# Patient Record
Sex: MALE | Race: BLACK OR AFRICAN AMERICAN | Employment: UNEMPLOYED | URBAN - METROPOLITAN AREA
[De-identification: names, ages, dates, MRNs, and addresses within clinical notes are randomized per-mention and may not be internally consistent; named-entity substitution may affect disease eponyms.]

---

## 2018-02-07 PROCEDURE — 99284 EMERGENCY DEPT VISIT MOD MDM: CPT

## 2018-02-07 PROCEDURE — 96365 THER/PROPH/DIAG IV INF INIT: CPT

## 2018-02-07 PROCEDURE — 96375 TX/PRO/DX INJ NEW DRUG ADDON: CPT

## 2018-02-08 ENCOUNTER — HOSPITAL ENCOUNTER (INPATIENT)
Age: 23
LOS: 1 days | Discharge: HOME | DRG: 101 | End: 2018-02-09
Attending: EMERGENCY MEDICINE | Admitting: FAMILY MEDICINE
Payer: SELF-PAY

## 2018-02-08 ENCOUNTER — APPOINTMENT (OUTPATIENT)
Dept: GENERAL RADIOLOGY | Age: 23
DRG: 101 | End: 2018-02-08
Attending: EMERGENCY MEDICINE
Payer: SELF-PAY

## 2018-02-08 ENCOUNTER — APPOINTMENT (OUTPATIENT)
Dept: MRI IMAGING | Age: 23
DRG: 101 | End: 2018-02-08
Attending: PSYCHIATRY & NEUROLOGY
Payer: SELF-PAY

## 2018-02-08 ENCOUNTER — APPOINTMENT (OUTPATIENT)
Dept: CT IMAGING | Age: 23
DRG: 101 | End: 2018-02-08
Attending: EMERGENCY MEDICINE
Payer: SELF-PAY

## 2018-02-08 DIAGNOSIS — R56.9 SEIZURE (HCC): Primary | ICD-10-CM

## 2018-02-08 PROBLEM — R51.9 HEADACHE: Status: ACTIVE | Noted: 2018-02-08

## 2018-02-08 LAB
ALBUMIN SERPL-MCNC: 4.3 G/DL (ref 3.5–5)
ALBUMIN/GLOB SERPL: 1.1 {RATIO} (ref 1.1–2.2)
ALP SERPL-CCNC: 91 U/L (ref 45–117)
ALT SERPL-CCNC: 20 U/L (ref 12–78)
AMPHET UR QL SCN: NEGATIVE
ANION GAP SERPL CALC-SCNC: 10 MMOL/L (ref 5–15)
APPEARANCE UR: CLEAR
AST SERPL-CCNC: 18 U/L (ref 15–37)
ATRIAL RATE: 95 BPM
BACTERIA URNS QL MICRO: NEGATIVE /HPF
BARBITURATES UR QL SCN: NEGATIVE
BASOPHILS # BLD: 0 K/UL (ref 0–0.1)
BASOPHILS NFR BLD: 1 % (ref 0–1)
BENZODIAZ UR QL: NEGATIVE
BILIRUB SERPL-MCNC: 1.3 MG/DL (ref 0.2–1)
BILIRUB UR QL: NEGATIVE
BUN SERPL-MCNC: 9 MG/DL (ref 6–20)
BUN/CREAT SERPL: 8 (ref 12–20)
CALCIUM SERPL-MCNC: 9.5 MG/DL (ref 8.5–10.1)
CALCULATED P AXIS, ECG09: 56 DEGREES
CALCULATED R AXIS, ECG10: 99 DEGREES
CALCULATED T AXIS, ECG11: 58 DEGREES
CANNABINOIDS UR QL SCN: POSITIVE
CHLORIDE SERPL-SCNC: 102 MMOL/L (ref 97–108)
CK SERPL-CCNC: 843 U/L (ref 39–308)
CO2 SERPL-SCNC: 26 MMOL/L (ref 21–32)
COCAINE UR QL SCN: NEGATIVE
COLOR UR: YELLOW
CREAT SERPL-MCNC: 1.07 MG/DL (ref 0.7–1.3)
DIAGNOSIS, 93000: NORMAL
DIFFERENTIAL METHOD BLD: ABNORMAL
DRUG SCRN COMMENT,DRGCM: ABNORMAL
EOSINOPHIL # BLD: 0.7 K/UL (ref 0–0.4)
EOSINOPHIL NFR BLD: 10 % (ref 0–7)
EPITH CASTS URNS QL MICRO: ABNORMAL /LPF
ERYTHROCYTE [DISTWIDTH] IN BLOOD BY AUTOMATED COUNT: 14.1 % (ref 11.5–14.5)
ETHANOL SERPL-MCNC: <10 MG/DL
GLOBULIN SER CALC-MCNC: 3.9 G/DL (ref 2–4)
GLUCOSE SERPL-MCNC: 73 MG/DL (ref 65–100)
GLUCOSE UR STRIP.AUTO-MCNC: NEGATIVE MG/DL
HCT VFR BLD AUTO: 42.4 % (ref 36.6–50.3)
HGB BLD-MCNC: 14.4 G/DL (ref 12.1–17)
HGB UR QL STRIP: NEGATIVE
IMM GRANULOCYTES # BLD: 0 K/UL (ref 0–0.04)
IMM GRANULOCYTES NFR BLD AUTO: 0 % (ref 0–0.5)
KETONES UR QL STRIP.AUTO: >80 MG/DL
LACTATE SERPL-SCNC: 0.9 MMOL/L (ref 0.4–2)
LEUKOCYTE ESTERASE UR QL STRIP.AUTO: NEGATIVE
LYMPHOCYTES # BLD: 1.6 K/UL (ref 0.8–3.5)
LYMPHOCYTES NFR BLD: 23 % (ref 12–49)
MAGNESIUM SERPL-MCNC: 2.5 MG/DL (ref 1.6–2.4)
MCH RBC QN AUTO: 27 PG (ref 26–34)
MCHC RBC AUTO-ENTMCNC: 34 G/DL (ref 30–36.5)
MCV RBC AUTO: 79.5 FL (ref 80–99)
METHADONE UR QL: NEGATIVE
MONOCYTES # BLD: 0.5 K/UL (ref 0–1)
MONOCYTES NFR BLD: 7 % (ref 5–13)
MUCOUS THREADS URNS QL MICRO: ABNORMAL /LPF
NEUTS SEG # BLD: 4.1 K/UL (ref 1.8–8)
NEUTS SEG NFR BLD: 59 % (ref 32–75)
NITRITE UR QL STRIP.AUTO: NEGATIVE
NRBC # BLD: 0 K/UL (ref 0–0.01)
NRBC BLD-RTO: 0 PER 100 WBC
OPIATES UR QL: NEGATIVE
P-R INTERVAL, ECG05: 150 MS
PCP UR QL: NEGATIVE
PH UR STRIP: 6 [PH]
PHOSPHATE SERPL-MCNC: 2.4 MG/DL (ref 2.6–4.7)
PLATELET # BLD AUTO: 351 K/UL (ref 150–400)
PMV BLD AUTO: 12.6 FL (ref 8.9–12.9)
POTASSIUM SERPL-SCNC: 3.9 MMOL/L (ref 3.5–5.1)
PROT SERPL-MCNC: 8.2 G/DL (ref 6.4–8.2)
PROT UR STRIP-MCNC: NEGATIVE MG/DL
Q-T INTERVAL, ECG07: 330 MS
QRS DURATION, ECG06: 74 MS
QTC CALCULATION (BEZET), ECG08: 414 MS
RBC # BLD AUTO: 5.33 M/UL (ref 4.1–5.7)
RBC #/AREA URNS HPF: ABNORMAL /HPF (ref 0–5)
SODIUM SERPL-SCNC: 138 MMOL/L (ref 136–145)
SP GR UR REFRACTOMETRY: 1.02
TROPONIN I SERPL-MCNC: <0.04 NG/ML
UA: UC IF INDICATED,UAUC: ABNORMAL
UROBILINOGEN UR QL STRIP.AUTO: 0.2 EU/DL
VENTRICULAR RATE, ECG03: 95 BPM
WBC # BLD AUTO: 6.9 K/UL (ref 4.1–11.1)
WBC URNS QL MICRO: ABNORMAL /HPF (ref 0–4)

## 2018-02-08 PROCEDURE — 84100 ASSAY OF PHOSPHORUS: CPT | Performed by: EMERGENCY MEDICINE

## 2018-02-08 PROCEDURE — 74011250637 HC RX REV CODE- 250/637: Performed by: PSYCHIATRY & NEUROLOGY

## 2018-02-08 PROCEDURE — 70450 CT HEAD/BRAIN W/O DYE: CPT

## 2018-02-08 PROCEDURE — 36415 COLL VENOUS BLD VENIPUNCTURE: CPT | Performed by: EMERGENCY MEDICINE

## 2018-02-08 PROCEDURE — 74011000258 HC RX REV CODE- 258: Performed by: EMERGENCY MEDICINE

## 2018-02-08 PROCEDURE — 65660000000 HC RM CCU STEPDOWN

## 2018-02-08 PROCEDURE — 74011000250 HC RX REV CODE- 250: Performed by: FAMILY MEDICINE

## 2018-02-08 PROCEDURE — 74011250637 HC RX REV CODE- 250/637: Performed by: EMERGENCY MEDICINE

## 2018-02-08 PROCEDURE — 80307 DRUG TEST PRSMV CHEM ANLYZR: CPT | Performed by: EMERGENCY MEDICINE

## 2018-02-08 PROCEDURE — 80053 COMPREHEN METABOLIC PANEL: CPT | Performed by: EMERGENCY MEDICINE

## 2018-02-08 PROCEDURE — 87040 BLOOD CULTURE FOR BACTERIA: CPT | Performed by: EMERGENCY MEDICINE

## 2018-02-08 PROCEDURE — 74011250636 HC RX REV CODE- 250/636

## 2018-02-08 PROCEDURE — A9576 INJ PROHANCE MULTIPACK: HCPCS | Performed by: EMERGENCY MEDICINE

## 2018-02-08 PROCEDURE — 71045 X-RAY EXAM CHEST 1 VIEW: CPT

## 2018-02-08 PROCEDURE — 74011250636 HC RX REV CODE- 250/636: Performed by: FAMILY MEDICINE

## 2018-02-08 PROCEDURE — 81001 URINALYSIS AUTO W/SCOPE: CPT | Performed by: FAMILY MEDICINE

## 2018-02-08 PROCEDURE — 74011250636 HC RX REV CODE- 250/636: Performed by: EMERGENCY MEDICINE

## 2018-02-08 PROCEDURE — 83605 ASSAY OF LACTIC ACID: CPT | Performed by: EMERGENCY MEDICINE

## 2018-02-08 PROCEDURE — 95816 EEG AWAKE AND DROWSY: CPT | Performed by: FAMILY MEDICINE

## 2018-02-08 PROCEDURE — 70553 MRI BRAIN STEM W/O & W/DYE: CPT

## 2018-02-08 PROCEDURE — 93005 ELECTROCARDIOGRAM TRACING: CPT

## 2018-02-08 PROCEDURE — 82550 ASSAY OF CK (CPK): CPT | Performed by: EMERGENCY MEDICINE

## 2018-02-08 PROCEDURE — 83735 ASSAY OF MAGNESIUM: CPT | Performed by: EMERGENCY MEDICINE

## 2018-02-08 PROCEDURE — 80307 DRUG TEST PRSMV CHEM ANLYZR: CPT | Performed by: FAMILY MEDICINE

## 2018-02-08 PROCEDURE — 84484 ASSAY OF TROPONIN QUANT: CPT | Performed by: EMERGENCY MEDICINE

## 2018-02-08 PROCEDURE — 85025 COMPLETE CBC W/AUTO DIFF WBC: CPT | Performed by: EMERGENCY MEDICINE

## 2018-02-08 RX ORDER — LEVETIRACETAM 500 MG/1
500 TABLET ORAL 2 TIMES DAILY
Status: DISCONTINUED | OUTPATIENT
Start: 2018-02-08 | End: 2018-02-09 | Stop reason: HOSPADM

## 2018-02-08 RX ORDER — ACETAMINOPHEN 650 MG/1
1000 SUPPOSITORY RECTAL
Status: COMPLETED | OUTPATIENT
Start: 2018-02-08 | End: 2018-02-08

## 2018-02-08 RX ORDER — ONDANSETRON 2 MG/ML
4 INJECTION INTRAMUSCULAR; INTRAVENOUS
Status: DISCONTINUED | OUTPATIENT
Start: 2018-02-08 | End: 2018-02-09 | Stop reason: HOSPADM

## 2018-02-08 RX ORDER — DICYCLOMINE HYDROCHLORIDE 10 MG/ML
20 INJECTION INTRAMUSCULAR
Status: DISCONTINUED | OUTPATIENT
Start: 2018-02-08 | End: 2018-02-08

## 2018-02-08 RX ORDER — SODIUM CHLORIDE 0.9 % (FLUSH) 0.9 %
5-10 SYRINGE (ML) INJECTION EVERY 8 HOURS
Status: DISCONTINUED | OUTPATIENT
Start: 2018-02-08 | End: 2018-02-09 | Stop reason: HOSPADM

## 2018-02-08 RX ORDER — KETOROLAC TROMETHAMINE 30 MG/ML
15 INJECTION, SOLUTION INTRAMUSCULAR; INTRAVENOUS
Status: DISCONTINUED | OUTPATIENT
Start: 2018-02-08 | End: 2018-02-08

## 2018-02-08 RX ORDER — LORAZEPAM 2 MG/ML
1 INJECTION INTRAMUSCULAR
Status: COMPLETED | OUTPATIENT
Start: 2018-02-08 | End: 2018-02-08

## 2018-02-08 RX ORDER — SODIUM CHLORIDE 9 MG/ML
1000 INJECTION, SOLUTION INTRAVENOUS ONCE
Status: COMPLETED | OUTPATIENT
Start: 2018-02-08 | End: 2018-02-08

## 2018-02-08 RX ORDER — SODIUM CHLORIDE 0.9 % (FLUSH) 0.9 %
5-10 SYRINGE (ML) INJECTION AS NEEDED
Status: DISCONTINUED | OUTPATIENT
Start: 2018-02-08 | End: 2018-02-09 | Stop reason: HOSPADM

## 2018-02-08 RX ORDER — LORAZEPAM 2 MG/ML
2 INJECTION INTRAMUSCULAR
Status: COMPLETED | OUTPATIENT
Start: 2018-02-08 | End: 2018-02-08

## 2018-02-08 RX ORDER — SODIUM CHLORIDE 0.9 % (FLUSH) 0.9 %
10 SYRINGE (ML) INJECTION
Status: COMPLETED | OUTPATIENT
Start: 2018-02-08 | End: 2018-02-08

## 2018-02-08 RX ORDER — LORAZEPAM 2 MG/ML
INJECTION INTRAMUSCULAR
Status: COMPLETED
Start: 2018-02-08 | End: 2018-02-08

## 2018-02-08 RX ORDER — LORAZEPAM 2 MG/ML
2 INJECTION INTRAMUSCULAR
Status: DISCONTINUED | OUTPATIENT
Start: 2018-02-08 | End: 2018-02-09 | Stop reason: HOSPADM

## 2018-02-08 RX ORDER — SODIUM CHLORIDE 9 MG/ML
1000 INJECTION, SOLUTION INTRAVENOUS ONCE
Status: DISPENSED | OUTPATIENT
Start: 2018-02-08 | End: 2018-02-08

## 2018-02-08 RX ADMIN — LORAZEPAM 1 MG: 2 INJECTION INTRAMUSCULAR; INTRAVENOUS at 03:00

## 2018-02-08 RX ADMIN — ACETAMINOPHEN 1000 MG: 650 SUPPOSITORY RECTAL at 08:38

## 2018-02-08 RX ADMIN — Medication 10 ML: at 09:50

## 2018-02-08 RX ADMIN — Medication 10 ML: at 16:15

## 2018-02-08 RX ADMIN — LORAZEPAM 2 MG: 2 INJECTION INTRAMUSCULAR; INTRAVENOUS at 03:16

## 2018-02-08 RX ADMIN — SODIUM CHLORIDE 1000 ML: 900 INJECTION, SOLUTION INTRAVENOUS at 03:16

## 2018-02-08 RX ADMIN — Medication 10 ML: at 14:25

## 2018-02-08 RX ADMIN — Medication 10 ML: at 19:22

## 2018-02-08 RX ADMIN — LEVETIRACETAM 500 MG: 500 TABLET ORAL at 19:20

## 2018-02-08 RX ADMIN — GADOTERIDOL 12 ML: 279.3 INJECTION, SOLUTION INTRAVENOUS at 16:15

## 2018-02-08 RX ADMIN — FOLIC ACID: 5 INJECTION, SOLUTION INTRAMUSCULAR; INTRAVENOUS; SUBCUTANEOUS at 08:38

## 2018-02-08 RX ADMIN — LORAZEPAM 2 MG: 2 INJECTION INTRAMUSCULAR at 03:16

## 2018-02-08 RX ADMIN — SODIUM CHLORIDE 1000 MG: 900 INJECTION, SOLUTION INTRAVENOUS at 03:23

## 2018-02-08 RX ADMIN — SODIUM CHLORIDE 1000 ML: 900 INJECTION, SOLUTION INTRAVENOUS at 03:22

## 2018-02-08 NOTE — ED NOTES
Reassessed patient at this time. He was asked for a urine sample, which he states he is unable to provide. He is otherwise stable. PA states patient is ready for discharge and will be getting paperwork together shortly.

## 2018-02-08 NOTE — H&P
[address] [facility]  ACUTE CARE HISTORY AND PHYSICAL    Name:Donna LIPSCOMB  MR#: [id number]  : 1995  ACCOUNT #: [de-identified]   DATE OF SERVICE: 2018    CHIEF COMPLAINT:  The patient does not provide. HISTORY OF PRESENT ILLNESS:  A 20-year-old male with a past medical history of congenital adrenal hyperplasia presented to the emergency department via EMS with reported headache and seizures. The patient currently is obtunded and unable to provide any history. As such, the majority of the history has been obtained from review of ED medical reports. According to the nurse triage report, the patient's friends had noted the patient stood up to drink some water and reportedly did not feel well, subsequently fell to the ground. There were no reports of injury. He had no reported history of having seizures. According to the note, EMS stated the patient was confused upon their arrival at the scene and reportedly had urinary incontinence. He reportedly had been having intermittent headaches over the past 4 years with some other complains of nausea, intermittent photophobia, one episode of vomiting. Reported recurrence of the headaches at 3:30 p.m. yesterday. On arrival in the emergency department, his recorded vital signs were blood pressure 113/72, heart rate 98, respiration rate of 16, saturating 100% on room air. Workup included CT of the head without contrast showing no acute intracranial abnormalities. While in the emergency department, the patient had seizure activity, which was witnessed by the ER staff, described as tonic-clonic movement with episode of desaturation, which since has resolved. He had been given Ativan 2 mg IV plus an additional 1 mg IV. He was also given Keppra 1000 mg IV, 0.9% normal saline 1000 mL fluid bolus x2. Patient since then was initially postictal and sedated. However, he has been very anxious and agitated with any tactile stimuli.   Currently, he is in 4-point restraints secondary to the same to better prevent the patient from self-harm. Patient is now seen for admission to the hospitalist service for continued evaluation and treatment. PAST MEDICAL HISTORY:  Congenital adrenal hyperplasia. PAST SURGICAL HISTORY:  None known. MEDICATIONS:  None known. ALLERGIES:  NO KNOWN DRUG ALLERGIES. SOCIAL HISTORY:  No reports of smoking, alcohol or illicit drugs. FAMILY HISTORY:  Unknown, unable to obtain from the patient. REVIEW OF SYSTEMS:  Unable to obtain review of systems. The patient is nonverbal, not answering questions. PHYSICAL EXAMINATION  VITAL SIGNS:  Temperature is 98.5 degrees Fahrenheit, blood pressure 116/62, heart rate 104, respiratory rate is 14, O2 saturation 97% on room air. GENERAL:  Patient in no acute respiratory distress. PSYCHIATRIC:  The patient is obtunded, but arouses to tactile stimuli, anxious, agitated, uncooperative. NEUROLOGIC:  GCS of 7 (E1V2M4) with no spontaneous eye opening, incomprehensible grunting sounds, and withdrawals to tactile stimuli. Moves extremities x4. Sensation grossly intact, as withdraws to tactile stimuli. No facial droop. HEENT:  Normocephalic, atraumatic. PERRLA, EOMs intact. Sclerae anicteric. Conjunctivae clear. Nares are patent. Oropharynx is clear. Tongue is difficult to assess as the patient keeps his mouth clenched. NECK:  Trachea is midline. No JVD, no carotid bruits, no thyromegaly, no acute palpable soft tissue, bone deformity. LYMPHATIC:  No cervical, supraclavicular lymphadenopathy. LUNGS:  Clear to auscultation bilaterally. CVS:  Heart is tachycardic, regular rhythm. No murmurs, rubs or gallops. GASTROINTESTINAL:  Abdomen is soft, nontender, nondistended. Normoactive bowel sounds. No rebound, guarding, rigidity. No auscultated abdominal bruits. No pulsatile mass. BACK:  No CVA tenderness. No step-off deformity.   MUSCULOSKELETAL:  No acute palpable deformities, negative for calf tenderness. VASCULAR:  2+ radial and DP pulses without cyanosis, clubbing or edema. SKIN:  Warm and dry. LABORATORY DATA:  Sodium is 138, potassium 3.9, chloride 102, CO2 26, BUN 9, creatinine 1.07, glucose 73, anion gap 10, calcium 9.5, phosphorus 2.4, magnesium 3.5, GFR greater than 60, total bilirubin was 1.3, total protein 8.2, albumin 4.3, ALT 20, AST of 18, alkaline phosphatase is 91. WBC 6.9, hemoglobin 14.4, hematocrit 42.4, platelets 115, neutrophils of 59%. Serum alcohol less than 10. CT of the head without contrast:  No acute process. IMPRESSION AND PLAN  1.  Seizures, status epilepticus. No prior history of the same. Consult with neurologist.  Order Ativan p.r.n. for any subsequent seizure activity. He had been given Keppra earlier tonight. Consult with a neurologist with regards to further dosing, place on seizure precautions. 2.  Altered mental status. Plan as noted above. Awaiting urine drug screen to be sent. Serum alcohol was less than 10. However, we will order banana bag therapy, as I have a limited history on the patient at this time. We will monitor closely. 3.  Headaches. Again, plan as noted above. Consult with neurologist.  Consider MRI of the brain. However, at current the patient is too agitated to perform any of these tests. 4.  Urinary incontinence. No recurrence noted. 5.  Nausea and vomiting. Order Zofran 4 mg IV q.6h. p.r.n.  6.  Anxiety/agitation. Continue workup as noted. 7.  Venous thromboembolism prophylaxis. SCDs to bilateral lower extremities. MD REID Sanchez / Dannie Jade  D: 02/08/2018 05:44     T: 02/08/2018 06:28  JOB #: [id number]

## 2018-02-08 NOTE — IP AVS SNAPSHOT
[address] [facility] [address] [place] [address] 
[phone] Patient: Mery Alonso MRN: [id number] :1995 About your hospitalization You were admitted on:  2018 You last received care in the:  [facility] 6S NEURO-SCI TELE You were discharged on:  2018 Why you were hospitalized Your primary diagnosis was:  Seizure (Hcc) Your diagnoses also included:  Headache Follow-up Information Follow up With Details Comments Contact Info [facility] on 2018 New patient intake appointment- 1:00 p.m.  [facility] 105 [facility] [address] None   None (395) Patient stated that they have no PCP Montez Cabezas DO In 1 week Follow up on  at 2:30 p.m.-arrive by 2:00 p.m.  [address] [facility] [address] [facility] at [address] 
[phone] Your Scheduled Appointments 2018  2:30 PM EST Follow Up with DO [facility] [address]  
[phone] Discharge Orders None A check aiden indicates which time of day the medication should be taken. My Medications START taking these medications Instructions Each Dose to Equal  
 Morning Noon Evening Bedtime  
 levETIRAcetam 500 mg tablet Commonly known as:  KEPPRA Your last dose was: Your next dose is: Take 1 Tab by mouth two (2) times a day. 500 mg Where to Get Your Medications Information on where to get these meds will be given to you by the nurse or doctor. ! Ask your nurse or doctor about these medications  
  levETIRAcetam 500 mg tablet Discharge Instructions Seizure: Care Instructions Your Care Instructions Seizures are caused by abnormal patterns of electrical signals in the brain. They are different for each person. Seizures can affect movement, speech, vision, or awareness. Some people have only slight shaking of a hand and do not pass out. Other people may pass out and have violent shaking of the whole body. Some people appear to stare into space. They are awake, but they can't respond normally. Later, they may not remember what happened. You may need tests to identify the type and cause of the seizures. A seizure may occur only once, or you may have them more than one time. Taking medicines as directed and following up with your doctor may help keep you from having more seizures. The doctor has checked you carefully, but problems can develop later. If you notice any problems or new symptoms, get medical treatment right away. Follow-up care is a key part of your treatment and safety. Be sure to make and go to all appointments, and call your doctor if you are having problems. It's also a good idea to know your test results and keep a list of the medicines you take. How can you care for yourself at home? · Be safe with medicines. Take your medicines exactly as prescribed. Call your doctor if you think you are having a problem with your medicine. · Do not do any activity that could be dangerous to you or others until your doctor says it is safe to do so. For example, do not drive a car, operate machinery, swim, or climb ladders. · Be sure that anyone treating you for any health problem knows that you have had a seizure and what medicines you are taking for it. · Identify and avoid things that may make you more likely to have a seizure. These may include lack of sleep, alcohol or drug use, stress, or not eating. · Make sure you go to your follow-up appointment. When should you call for help? Call 911 anytime you think you may need emergency care. For example, call if: 
? · You have another seizure. ? · You have more than one seizure in 24 hours. ? · You have new symptoms, such as trouble walking, speaking, or thinking clearly. ?Call your doctor now or seek immediate medical care if: 
? · You are not acting normally. ? Watch closely for changes in your health, and be sure to contact your doctor if you have any problems. Where can you learn more? Go to http://magen-padma.info/. Enter D298 in the search box to learn more about \"Seizure: Care Instructions. \" Current as of: October 14, 2016 Content Version: 11.4 © 4342-8496 [employer]. Care instructions adapted under license by [employer] (which disclaims liability or warranty for this information). If you have questions about a medical condition or this instruction, always ask your healthcare professional. [address] any warranty or liability for your use of this information. Intellikine Announcement We are excited to announce that we are making your provider's discharge notes available to you in Intellikine. You will see these notes when they are completed and signed by the physician that discharged you from your recent hospital stay. If you have any questions or concerns about any information you see in Intellikine, please call the Health Information Department where you were seen or reach out to your Primary Care Provider for more information about your plan of care. Introducing [facility]! Juany Zapata introduces Intellikine patient portal. Now you can access parts of your medical record, email your doctor's office, and request medication refills online. 1. In your internet browser, go to [email or web address] 2. Click on the First Time User? Click Here link in the Sign In box. You will see the New Member Sign Up page. 3. Enter your Intellikine Access Code exactly as it appears below.  You will not need to use this code after youve completed the sign-up process. If you do not sign up before the expiration date, you must request a new code. · Epitiro Access Code: [email or web address] Expires: 5/10/2018  2:00 PM 
 
4. Enter the last four digits of your Social Security Number (xxxx) and Date of Birth (mm/dd/yyyy) as indicated and click Submit. You will be taken to the next sign-up page. 5. Create a Epitiro ID. This will be your Epitiro login ID and cannot be changed, so think of one that is secure and easy to remember. 6. Create a Epitiro password. You can change your password at any time. 7. Enter your Password Reset Question and Answer. This can be used at a later time if you forget your password. 8. Enter your e-mail address. You will receive e-mail notification when new information is available in [address] 9. Click Sign Up. You can now view and download portions of your medical record. 10. Click the Download Summary menu link to download a portable copy of your medical information. If you have questions, please visit the Frequently Asked Questions section of the Epitiro website. Remember, Epitiro is NOT to be used for urgent needs. For medical emergencies, dial 911. Now available from your iPhone and Android! Unresulted Labs-Please follow up with your PCP about these lab tests Order Current Status CULTURE, BLOOD, PAIRED Preliminary result Providers Seen During Your Hospitalization Provider Specialty Primary office phone Cathy Lopez MD Emergency Medicine [phone] Chris Rueda MD Family Practice [phone] Aniket Klein MD Family Practice [phone] Your Primary Care Physician (PCP) Primary Care Physician Office Phone Office Fax NONE ** None ** ** None ** You are allergic to the following No active allergies Recent Documentation Weight BMI Smoking Status 61.1 kg 21.74 kg/m2 Never Smoker Emergency Contacts Name Discharge Info Relation Home Work Mobile Refuse,Refuse DISCHARGE CAREGIVER [3] Patient [10] [phone] Patient Belongings The following personal items are in your possession at time of discharge: 
     Visual Aid: None Please provide this summary of care documentation to your next provider. Signatures-by signing, you are acknowledging that this After Visit Summary has been reviewed with you and you have received a copy. Patient Signature:  ____________________________________________________________ Date:  ____________________________________________________________  
  
[facility] Provider Signature:  ____________________________________________________________ Date:  ____________________________________________________________

## 2018-02-08 NOTE — CONSULTS
NEUROLOGY CONSULT NOTE    Name Elizabet Rolle Age 23 y.o. MRN [id number]  1995     Consulting Physician: Cathy Lopez MD      Chief Complaint:  Seizure     Assessment:     · Principal Problem:  ·   Seizure ([address] (2018)  ·   · Active Problems:  ·   Headache (2018)  ·   ·   23year old AAM h/o adrenal hyperplasia presenting with new onset generalized seizure activity x 2 since 18. No seizure risk factors. Head CT showing NAP. Neurological examination notable for disorientation, otherwise non-focal.  Utox +THC, no other substances identified. Will obtain more detailed neuroimaging to exclude acute intracranial mass/lesion, focal cortical dysplasia. EEG with moderate diffuse slowing, sharply contoured waves greatest over the right hemisphere. He will be maintained on AEDs for new onset generalized epilepsy. Recommendations:   MRI Brain WWO  Cont. LEV 500mg BID for seizure ppx  Seizure precautions  May give Ativan 2mg IV PRN for seizure duration greater than or equal to 3 minutes or seizure frequency of 3 or more seizures per 8 hours  Will f/u imaging once completed- he will need Neuro F/U 4 weeks in my clinic      Thank you very much for this referral. I appreciate the opportunity to participate in this patient's care. History of Present Illness: This is a 23 y.o.  right handed  male, we were asked to see for AMS, seizure activity. PMH notable for congenital adrenal hyperplasia. Pt has minimal recollection of events prior to admission. He is able to recall that he felt as if he would pass out. His friend noted that the pt was standing when the event occurred. He subsequently fell to the ground and was witnessed with seizure like activity per EMR. There was associated urinary incontinence without tongue biting. He was post ictal on ED arrival.  He had complained of headache earlier in the day which was not unusual for him.   He denies a h/o seizure d/o, FHx seiuzres, h/o significant head injury or febrile seizures. He denies excessive EtOH use, substance abuse. Head CT on arrival did not reveal any acute process. He was given LEV 1g x 1 after having GTC activity in the ED witnessed by staff with desaturation. No further events since this time. Tox screen +THC. CK slightly elevated on admission. No Known Allergies     Prior to Admission medications    Not on File       Past Medical History:   Diagnosis Date    Adrenal hyperplasia, congenital ([facility] [facility]         History reviewed. No pertinent surgical history. Social History   Substance Use Topics    Smoking status: Never Smoker    Smokeless tobacco: Never Used    Alcohol use No        History reviewed. No pertinent family history. Review of Systems:   Comprehensive review of systems performed and negative except for as listed above. Exam:     Visit Vitals    BP (!) 84/35    Pulse 90    Temp 98.3 °F (36.8 °C)    Resp 16    SpO2 97%        General: Well developed, well nourished. Slightly fatigued. Head: Normocephalic, atraumatic, anicteric sclera   Lungs:  Clear to auscultation bilaterally, No wheezes or rubs   Cardiac: Regular rate and rhythm with no murmurs. Abd: Bowel sounds were audible. No tenderness on palpation   Ext: No pedal edema   Skin: No overt signs of rash     Neurological Exam:  Mental Status: Lethargic, arouses with repetitive tactile stimulation. Disoriented to place/date. Knows month/year. Speech: Fluent no aphasia or dysarthria. Cranial Nerves:   Intact visual fields. Facial sensation is normal. Facial movement is symmetric. Palate is midline. Normal sternocleidomastoid strength. Tongue is midline. Hearing is intact bilaterally. Eyes: PERRL, EOM's full, no nystagmus, no ptosis. Motor:  Full and symmetric strength of upper and lower proximal and distal muscles. Normal bulk and tone.     Reflexes:   Deep tendon reflexes 2+/4 and symmetrical.  Plantar response is downgoing b/l. Sensory:   Symmetrically intact  with no perceived deficits modalities involving small or large fibers. Gait:  Gait is deferred    Tremor:   No tremor noted. Cerebellar:  Finger to nose and heel over shin to knee was demonstrated competently. Neurovascular: No carotid bruits. Imaging  CT Results (maximum last 3): Results from [place] encounter on 02/08/18   CT HEAD WO CONT   Narrative EXAM:  CT HEAD WO CONT    INDICATION: Syncope today, possible seizure. COMPARISON: None    TECHNIQUE: Noncontrast head CT. Coronal and sagittal reformats. CT dose  reduction was achieved through the use of a standardized protocol tailored for  this examination and automatic exposure control for dose modulation. Adaptive  statistical iterative reconstruction (ASIR) was utilized. FINDINGS: The ventricles and sulci are age-appropriate without hydrocephalus. There is no mass effect or midline shift. There is no intracranial hemorrhage or  extra-axial fluid collection. There is no abnormal area of decreased density to  suggest infarct. The calvarium is intact. The visualized paranasal sinuses and mastoid air cells  are clear. Impression IMPRESSION:     Normal noncontrast head CT. MRI Results (maximum last 3): No results found for this or any previous visit. Lab Review  Lab Results   Component Value Date/Time    WBC 6.9 02/08/2018 12:22 AM    HCT 42.4 02/08/2018 12:22 AM    HGB 14.4 02/08/2018 12:22 AM    PLATELET 141 41/55/5502 12:22 AM     Lab Results   Component Value Date/Time    Sodium 138 02/08/2018 12:22 AM    Potassium 3.9 02/08/2018 12:22 AM    Chloride 102 02/08/2018 12:22 AM    CO2 26 02/08/2018 12:22 AM    Glucose 73 02/08/2018 12:22 AM    BUN 9 02/08/2018 12:22 AM    Creatinine 1.07 02/08/2018 12:22 AM    Calcium 9.5 02/08/2018 12:22 AM     No components found for: TROPQUANT  No results found for: SAMUEL    Signed:  Angelina Hill DO  2/8/2018  1:35 PM

## 2018-02-08 NOTE — ED NOTES
PA summoned author to room to assist with pt who began seizing while PA in room. Pt found in recovery position, seizing, and incontinent of urine. IV pulled out by patient's movements, established new PIV and administered 1mg ativan. Seizure pads placed. Pt then began thrashing around and yelling, eyes rolled back, not responding to verbal inquiries. Administered 2mg additional ativan per order and ordered restraints placed on patient. Keppra administered.     Pt on 2L nc, VSS WDL

## 2018-02-08 NOTE — ED NOTES
Report received from Martha leonard, [facility]. Patient resting comfortably in stretcher, seizure precautions remain. Monitoring continued. Call bell within patient reach.

## 2018-02-08 NOTE — ED NOTES
Patient resting comfortably in stretcher, monitoring continues. Family bedside. Cassidy Mercer [phone], Lin Kimbrough [phone]. Family updated as to patient status.

## 2018-02-08 NOTE — ED NOTES
Patient reassessed at this time. Patient continues to be confused, but is no longer combative. He is still not cooperating enough to have an EKG completed at this time. Will continue to monitor.

## 2018-02-08 NOTE — ED NOTES
Assumed care of patient at this time. Patient states that he has a headache, and that he has a history of intermittent headaches. States that witnesses state he had a seizure, but he has no recollection of the event and states he has no history of seizures. CM x 2.

## 2018-02-08 NOTE — ED TRIAGE NOTES
Pts friends said that the pt got up to get some water after he stated he didn't feel well, per friends pt fell to the ground, no injury from the fall. Pt is unsure if he has a seizure history, pt takes no medications for seizures. EMS states that pt was confused upon their arrival. Pt did urinate on himself.

## 2018-02-08 NOTE — ROUTINE PROCESS
TRANSFER - OUT REPORT:    Verbal report given to Josh Toledo RN(name) on Elizabet Rolle  being transferred to 668(unit) for routine progression of care       Report consisted of patients Situation, Background, Assessment and   Recommendations(SBAR). Information from the following report(s) SBAR was reviewed with the receiving nurse. Lines:   Peripheral IV 02/08/18 Left Antecubital (Active)       Peripheral IV 02/08/18 Left Wrist (Active)   Site Assessment Clean, dry, & intact 2/8/2018  9:02 AM   Phlebitis Assessment 0 2/8/2018  9:02 AM   Infiltration Assessment 0 2/8/2018  9:02 AM   Dressing Type Transparent 2/8/2018  9:02 AM   Hub Color/Line Status Pink 2/8/2018  9:02 AM   Action Taken Blood drawn; Wrapped 2/8/2018  9:02 AM   Alcohol Cap Used No 2/8/2018  9:02 AM       Peripheral IV 02/08/18 Right Antecubital (Active)        Opportunity for questions and clarification was provided.       Patient transported with:   [employer]

## 2018-02-08 NOTE — ED NOTES
Patient resting comfortably in stretcher, monitoring continues, arouses to voice,  Continues to be irritable and confused upon waking.

## 2018-02-08 NOTE — ED NOTES
Report given to [address] [facility] [address] BENITO Blackmon. Patient resting comfortably in stretcher, monitoring continues. Family bedside, call bell within reach.

## 2018-02-08 NOTE — ED NOTES
Patient is combative at this time and not responding to commands. He will ope his eyes to sternal chest rub and he will moan but is otherwise not responding appropriately. He continues to try to turn onto his side. Not cooperating enough to get an EKG at this time, will continue to attempt. Remains in 4 point restraints for safety at this time. CM x 3. Call bell within reach of patient.

## 2018-02-08 NOTE — PROCEDURES
Name: Moe Conti    : 1995    DATE: 18    ELECTROENCEPHALOGRAM REPORT    HISTORY: The patient is a 57-year-old male who is being evaluated for  altered mental status and seizure activity. DESCRIPTION OF RECORDING: This is an 18-channel EEG performed on a poorly  responsive patient. There is no clear dominant background rhythm. Diffuse slowing is noted throughout the recording. Intermittent sharply contoured waves  are noted originating from the right hemisphere without evidence of EEG seizures. Photic stimulation and hyperventilation were not performed. EEG SUMMARY: Abnormal EEG due to moderate diffuse slowing and intermittent sharply contoured waves  greatest over the right hemisphere    CLINICAL INTERPRETATION: This EEG is suggestive of moderate generalized  encephalopathic process, nonspecific in type. This may be related to an  underlying structural brain injury and/or toxic/metabolic abnormality. There are sharply contoured waves involving the right greater than left hemispheres  without associated EEG seizures. Please correlate clinically.     Bo Pressley DO  18

## 2018-02-08 NOTE — ED PROVIDER NOTES
HPI Comments: 69-year-old male presents emergency room for evaluation of headache and possible seizure activity. Patient states for the past 4 years he has been having intermittent headaches. Headaches are described as a first and followed by pulse and sensation. These are accompanied with nausea and intermittent photophobia. He has had one episode of vomiting with the headaches. This afternoon approximately 3:30 patient had another one of his typical headaches that was more intense. Patient states he walked home and ate. Patient states he was sitting in a chair and began to feel well. Patient states he then remembers waking up in an ambulance. The patient's friends told him he had a seizure. He states that his friends told him he was shaking. He did have loss of bladder control. He did not bite his tongue. It is unknown how long it lasted. This has never had it before. Patient recurrently feels in his normal state. He has a mild headache. Social hx  Nonsmoker  No alcohol    Patient is a 23 y.o. male presenting with seizures. The history is provided by the patient. Seizure    Pertinent negatives include no confusion, no headaches, no sore throat, no chest pain, no cough, no nausea and no vomiting. He reports no chest pain, no confusion, no vomiting, no headaches, no sore throat, no cough. Past Medical History:   Diagnosis Date    Adrenal hyperplasia, congenital ([address]        History reviewed. No pertinent surgical history. History reviewed. No pertinent family history. Social History     Social History    Marital status: SINGLE     Spouse name: N/A    Number of children: N/A    Years of education: N/A     Occupational History    Not on file.      Social History Main Topics    Smoking status: Never Smoker    Smokeless tobacco: Never Used    Alcohol use No    Drug use: No    Sexual activity: Not on file     Other Topics Concern    Not on file     Social History Narrative    No narrative on file         ALLERGIES: Review of patient's allergies indicates no known allergies. Review of Systems   Constitutional: Negative for chills and fever. HENT: Negative for congestion, rhinorrhea and sore throat. Respiratory: Negative for cough and shortness of breath. Cardiovascular: Negative for chest pain. Gastrointestinal: Negative for abdominal pain, nausea and vomiting. Genitourinary: Negative for dysuria, flank pain and urgency. Musculoskeletal: Negative for back pain and neck pain. Skin: Negative for color change and rash. Neurological: Positive for seizures. Negative for dizziness, syncope, weakness, light-headedness and headaches. Psychiatric/Behavioral: Negative for agitation and confusion. All other systems reviewed and are negative. Vitals:    02/08/18 0010   BP: 113/72   Pulse: 98   Resp: 16   Temp: 98.5 °F (36.9 °C)   SpO2: 100%            Physical Exam   Constitutional: He is oriented to person, place, and time. He appears well-developed and well-nourished. No distress. HENT:   Head: Normocephalic and atraumatic. Right Ear: External ear normal.   Mouth/Throat: Oropharynx is clear and moist. No oropharyngeal exudate. Eyes: Conjunctivae and EOM are normal. Pupils are equal, round, and reactive to light. Neck: Normal range of motion. Neck supple. Cardiovascular: Normal rate and regular rhythm. Pulmonary/Chest: Effort normal and breath sounds normal.   Abdominal: Soft. Bowel sounds are normal. He exhibits no distension and no mass. There is no tenderness. There is no rebound and no guarding. Musculoskeletal: Normal range of motion. Neurological: He is alert and oriented to person, place, and time. He has normal reflexes. No cranial nerve deficit. He exhibits normal muscle tone.  Coordination normal.   Cranial Nerves:  I: smell  Not tested   II: pupils  Equal, round, reactive to light   III,VII: ptosis  none   III,IV,VI: extraocular muscles   normal   V: mastication  normal   V: facial light touch sensation   normal   VII: facial muscle function   symmetric   VIII: hearing  symmetric   IX: soft palate elevation   normal   XI: sternocleidomastoid strength  5/5   XII: tongue   midline          Skin: Skin is warm and dry. No erythema. Psychiatric: He has a normal mood and affect. His behavior is normal. Judgment and thought content normal.   Nursing note and vitals reviewed. MDM  Number of Diagnoses or Management Options  Seizure [facility]):   Diagnosis management comments: 77-year-old male presenting for headache and seizure activity. Headache is not new for patient. This is been ongoing for several years. Seizure activity is new. Patient is currently alert and oriented. Complaining of some minimal headache. He's had no recent illnesses. He is lungs are clear bilaterally. Abdomen soft nontender. No neurological deficits on exam. He is afebrile. Plan: Intravenous, labs, urinalysis, urine drug screen, CT    3:09 AM  I Witnessed 45 second seizure. Pt with tonic clonic movements, desaturation. Resolved spontaneously prior to administration of ativan. Will give 2nd liter bolus, give 1 gram of keppra and admit. Case discussed with Dr. Brittanie Taylor by Dr. Ant Cui for admission. ED Course       Procedures    CONSULT NOTE:  4:01 AM Blayne Ruano MD spoke with Dr. Brittanie Taylor, Consult for Hospitalist.  Discussed available diagnostic tests and clinical findings. He is in agreement with care plans as outlined. Dr. Brittanie Taylor will evaluate the patient for admission to the hospital.           Pt has been seen and evaluated by attending physician who has reviewed lab work and imaging tests.

## 2018-02-09 VITALS
WEIGHT: 134.7 LBS | TEMPERATURE: 98.9 F | BODY MASS INDEX: 21.74 KG/M2 | OXYGEN SATURATION: 100 % | SYSTOLIC BLOOD PRESSURE: 108 MMHG | RESPIRATION RATE: 19 BRPM | HEART RATE: 94 BPM | DIASTOLIC BLOOD PRESSURE: 55 MMHG

## 2018-02-09 LAB
ANION GAP SERPL CALC-SCNC: 13 MMOL/L (ref 5–15)
BASOPHILS # BLD: 0.1 K/UL (ref 0–0.1)
BASOPHILS NFR BLD: 1 % (ref 0–1)
BUN SERPL-MCNC: 4 MG/DL (ref 6–20)
BUN/CREAT SERPL: 5 (ref 12–20)
CALCIUM SERPL-MCNC: 8.6 MG/DL (ref 8.5–10.1)
CHLORIDE SERPL-SCNC: 107 MMOL/L (ref 97–108)
CO2 SERPL-SCNC: 18 MMOL/L (ref 21–32)
CREAT SERPL-MCNC: 0.74 MG/DL (ref 0.7–1.3)
DIFFERENTIAL METHOD BLD: ABNORMAL
EOSINOPHIL # BLD: 0.2 K/UL (ref 0–0.4)
EOSINOPHIL NFR BLD: 3 % (ref 0–7)
ERYTHROCYTE [DISTWIDTH] IN BLOOD BY AUTOMATED COUNT: 13.6 % (ref 11.5–14.5)
GLUCOSE BLD STRIP.AUTO-MCNC: 105 MG/DL (ref 65–100)
GLUCOSE BLD STRIP.AUTO-MCNC: 77 MG/DL (ref 65–100)
GLUCOSE SERPL-MCNC: 57 MG/DL (ref 65–100)
HCT VFR BLD AUTO: 37.1 % (ref 36.6–50.3)
HGB BLD-MCNC: 12.7 G/DL (ref 12.1–17)
IMM GRANULOCYTES # BLD: 0 K/UL
IMM GRANULOCYTES NFR BLD AUTO: 0 %
LYMPHOCYTES # BLD: 1.4 K/UL (ref 0.8–3.5)
LYMPHOCYTES NFR BLD: 21 % (ref 12–49)
MAGNESIUM SERPL-MCNC: 1.7 MG/DL (ref 1.6–2.4)
MCH RBC QN AUTO: 27.3 PG (ref 26–34)
MCHC RBC AUTO-ENTMCNC: 34.2 G/DL (ref 30–36.5)
MCV RBC AUTO: 79.6 FL (ref 80–99)
MONOCYTES # BLD: 0.5 K/UL (ref 0–1)
MONOCYTES NFR BLD: 8 % (ref 5–13)
NEUTS BAND NFR BLD MANUAL: 1 % (ref 0–6)
NEUTS SEG # BLD: 4.4 K/UL (ref 1.8–8)
NEUTS SEG NFR BLD: 66 % (ref 32–75)
NRBC # BLD: 0 K/UL (ref 0–0.01)
NRBC BLD-RTO: 0 PER 100 WBC
PLATELET # BLD AUTO: 211 K/UL (ref 150–400)
POTASSIUM SERPL-SCNC: 3.5 MMOL/L (ref 3.5–5.1)
RBC # BLD AUTO: 4.66 M/UL (ref 4.1–5.7)
RBC MORPH BLD: ABNORMAL
SERVICE CMNT-IMP: ABNORMAL
SERVICE CMNT-IMP: NORMAL
SODIUM SERPL-SCNC: 138 MMOL/L (ref 136–145)
WBC # BLD AUTO: 6.6 K/UL (ref 4.1–11.1)
WBC MORPH BLD: ABNORMAL

## 2018-02-09 PROCEDURE — 85025 COMPLETE CBC W/AUTO DIFF WBC: CPT | Performed by: FAMILY MEDICINE

## 2018-02-09 PROCEDURE — 82962 GLUCOSE BLOOD TEST: CPT

## 2018-02-09 PROCEDURE — 74011250637 HC RX REV CODE- 250/637: Performed by: PSYCHIATRY & NEUROLOGY

## 2018-02-09 PROCEDURE — 74011000250 HC RX REV CODE- 250: Performed by: FAMILY MEDICINE

## 2018-02-09 PROCEDURE — 74011250636 HC RX REV CODE- 250/636: Performed by: FAMILY MEDICINE

## 2018-02-09 PROCEDURE — 36415 COLL VENOUS BLD VENIPUNCTURE: CPT | Performed by: FAMILY MEDICINE

## 2018-02-09 PROCEDURE — 80048 BASIC METABOLIC PNL TOTAL CA: CPT | Performed by: FAMILY MEDICINE

## 2018-02-09 PROCEDURE — 83735 ASSAY OF MAGNESIUM: CPT | Performed by: FAMILY MEDICINE

## 2018-02-09 RX ORDER — LEVETIRACETAM 500 MG/1
500 TABLET ORAL 2 TIMES DAILY
Qty: 60 TAB | Refills: 0 | Status: SHIPPED | OUTPATIENT
Start: 2018-02-09 | End: 2018-02-19 | Stop reason: SDUPTHER

## 2018-02-09 RX ADMIN — FOLIC ACID: 5 INJECTION, SOLUTION INTRAMUSCULAR; INTRAVENOUS; SUBCUTANEOUS at 08:51

## 2018-02-09 RX ADMIN — Medication 10 ML: at 14:00

## 2018-02-09 RX ADMIN — LEVETIRACETAM 500 MG: 500 TABLET ORAL at 08:51

## 2018-02-09 RX ADMIN — Medication 10 ML: at 06:00

## 2018-02-09 NOTE — PROGRESS NOTES
Bedside and Verbal shift change report given to [address] (oncoming nurse) by Laura Jacques RN (offgoing nurse). Report included the following information Kardex.

## 2018-02-09 NOTE — PROGRESS NOTES
TRANSFER - IN REPORT:    Verbal report received from Teresa RN(name) on Elizabet Rolle  being received from ED(unit) for routine progression of care      Report consisted of patients Situation, Background, Assessment and   Recommendations(SBAR). Information from the following report(s) SBAR, Kardex and Recent Results was reviewed with the receiving nurse. Opportunity for questions and clarification was provided. Assessment completed upon patients arrival to unit and care assumed.

## 2018-02-09 NOTE — PROGRESS NOTES
Patient presented to the ED secondary to a seizure. The CM met with the patient at bedside in order to introduce the role of CM and assess for patient needs- patient's cousin present at bedside. The patient stated his mailing address or [place] is in [facility], however, currently living at [place] [address]. 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 \" The patient endorsed that his cousin could provide transportation upon discharge, or he would utilize [facility]. The patient is currently uninsured- stated that he hopes to go under his mother's insurance soon. The CM provided the patient with a Care Card application, in addition, the patient does not have PCP- CM discussed [facility], and patient is agreeable. CM called and established a new patient appointment/screening for 2/19 at 1:00 p.m. CM provided patient with [facility] Application. No other needs or concerns identified at this time. CM will continue to follow.  BRYCE Shook    Care Management Interventions  PCP Verified by CM:  (Patient does not have PCP)  Mode of Transport at Discharge: Self (Patient stated cousin could provide transportation upon discharge)  Transition of Care Consult (CM Consult): Discharge Planning  MyChart Signup: No  Discharge Durable Medical Equipment: No  Health Maintenance Reviewed: Yes  Physical Therapy Consult: No  Occupational Therapy Consult: No  Speech Therapy Consult: No  Current Support Network: Own Home (Patient's physical address is [address] [place] [address], [facility] [address])  Confirm Follow Up Transport: Family (Patient stated that a cousin could provide transportation or utilize [facility] )  Plan discussed with Pt/Family/Caregiver: Yes  [place] Resource Information Provided?: No  Discharge Location  Discharge Placement: Home

## 2018-02-09 NOTE — PROGRESS NOTES
Neurology Progress Note    Patient ID:  Rohit Friedman  [id number]  23 y.o.  1995    Subjective:      Patient more alert/awake today. Following all commands. No further generalized seizure activity. Doesn't recall much of yesterday. He tells me he has been experiencing episodes of jenna vu since 2012 which are sporadic, typically lasting 10-15 seconds at most followed by migraines, nausea. No associated confusion or LOC but +fatigue following events. Frequency varies from once every few months to several times weekly. He denies ever having convulsive seizure activity prior to this admission. Hypoglycemia noted on AM labs today. Current Facility-Administered Medications   Medication Dose Route Frequency    sodium chloride (NS) flush 5-10 mL  5-10 mL IntraVENous Q8H    sodium chloride (NS) flush 5-10 mL  5-10 mL IntraVENous PRN    LORazepam (ATIVAN) injection 2 mg  2 mg IntraVENous Q4H PRN    0.9% sodium chloride 1,000 mL with mvi, adult no. 4 with vit K 10 mL, thiamine 779 mg, folic acid 1 mg infusion   IntraVENous Q24H    ondansetron (ZOFRAN) injection 4 mg  4 mg IntraVENous Q6H PRN    levETIRAcetam (KEPPRA) tablet 500 mg  500 mg Oral BID          Objective:     Patient Vitals for the past 8 hrs:   BP Temp Pulse Resp SpO2   02/09/18 1116 108/55 98.9 °F (37.2 °C) 94 19 100 %   02/09/18 0648 96/49 97.6 °F (36.4 °C) 98 18 100 %          02/07 1901 - 02/09 0700  In: 2001.7 [P.O.:60; I.V.:1941.7]  Out: 600 [Urine:600]    Lab Review   Recent Results (from the past 24 hour(s))   MAGNESIUM    Collection Time: 02/09/18  3:31 AM   Result Value Ref Range    Magnesium 1.7 1.6 - 2.4 mg/dL   METABOLIC PANEL, BASIC    Collection Time: 02/09/18  3:32 AM   Result Value Ref Range    Sodium 138 136 - 145 mmol/L    Potassium 3.5 3.5 - 5.1 mmol/L    Chloride 107 97 - 108 mmol/L    CO2 18 (L) 21 - 32 mmol/L    Anion gap 13 5 - 15 mmol/L    Glucose 57 (L) 65 - 100 mg/dL    BUN 4 (L) 6 - 20 MG/DL    Creatinine 0.74 0.70 - 1.30 MG/DL    BUN/Creatinine ratio 5 (L) 12 - 20      GFR est AA >60 >60 ml/min/1.73m2    GFR est non-AA >60 >60 ml/min/1.73m2    Calcium 8.6 8.5 - 10.1 MG/DL   CBC WITH AUTOMATED DIFF    Collection Time: 02/09/18  3:32 AM   Result Value Ref Range    WBC 6.6 4.1 - 11.1 K/uL    RBC 4.66 4.10 - 5.70 M/uL    HGB 12.7 12.1 - 17.0 g/dL    HCT 37.1 36.6 - 50.3 %    MCV 79.6 (L) 80.0 - 99.0 FL    MCH 27.3 26.0 - 34.0 PG    MCHC 34.2 30.0 - 36.5 g/dL    RDW 13.6 11.5 - 14.5 %    PLATELET 219 060 - 978 K/uL    NRBC 0.0 0  WBC    ABSOLUTE NRBC 0.00 0.00 - 0.01 K/uL    NEUTROPHILS 66 32 - 75 %    BAND NEUTROPHILS 1 0 - 6 %    LYMPHOCYTES 21 12 - 49 %    MONOCYTES 8 5 - 13 %    EOSINOPHILS 3 0 - 7 %    BASOPHILS 1 0 - 1 %    IMMATURE GRANULOCYTES 0 %    ABS. NEUTROPHILS 4.4 1.8 - 8.0 K/UL    ABS. LYMPHOCYTES 1.4 0.8 - 3.5 K/UL    ABS. MONOCYTES 0.5 0.0 - 1.0 K/UL    ABS. EOSINOPHILS 0.2 0.0 - 0.4 K/UL    ABS. BASOPHILS 0.1 0.0 - 0.1 K/UL    ABS. IMM. GRANS. 0.0 K/UL    DF MANUAL      RBC COMMENTS ANISOCYTOSIS  1+        RBC COMMENTS OVALOCYTES  PRESENT        RBC COMMENTS MICROCYTOSIS  1+        WBC COMMENTS REACTIVE LYMPHS     GLUCOSE, POC    Collection Time: 02/09/18 11:53 AM   Result Value Ref Range    Glucose (POC) 77 65 - 100 mg/dL    Performed by Cait Robbins    GLUCOSE, POC    Collection Time: 02/09/18 12:21 PM   Result Value Ref Range    Glucose (POC) 105 (H) 65 - 100 mg/dL    Performed by Cait Robbins      Neurological Exam:  Mental Status: Awake, alert, oriented   Speech: Fluent no aphasia or dysarthria. Cranial Nerves:   Intact visual fields. Facial sensation is normal. Facial movement is symmetric. Palate is midline. Normal sternocleidomastoid strength. Tongue is midline. Hearing is intact bilaterally. Eyes: PERRL, EOM's full, no nystagmus, no ptosis. Motor:  Full and symmetric strength of upper and lower proximal and distal muscles. Normal bulk and tone.     Reflexes:   Deep tendon reflexes 2+/4 and symmetrical.  Plantar response is downgoing b/l. Sensory:   Symmetrically intact  with no perceived deficits modalities involving small or large fibers. Gait:  Gait is deferred    Tremor:   No tremor noted. Cerebellar:  Finger to nose and heel over shin to knee was demonstrated competently. Neurovascular: No carotid bruits. Assessment:     Principal Problem:    Seizure ([address] (2/8/2018)    Active Problems:    Headache (2/8/2018)      23year old AAM h/o adrenal hyperplasia presenting with new onset generalized seizure activity x 2 since 2/8/18. No seizure risk factors, although endorses a h/o \"jenna vu\" followed by migraines/nausea and fatigue since 2012. Head CT showing NAP. Utox +THC, no other substances identified. EEG with moderate diffuse slowing, sharply contoured waves greatest over the right hemisphere. MRI Brain completed and with out intracranial mass or cortical dysplasia. No recurrence of generalized seizure activity since admission. He will be maintained on AEDs for new onset generalized epilepsy. Plan:   Cont.  LEV 500mg BID  Advised of seizure precautions including no driving x 6 months from last seizure activity  F/U Neuro 4 weeks      Signed:  Ibrahima Sands DO  2/9/2018  1:56 PM

## 2018-02-09 NOTE — PROGRESS NOTES
Primary Nurse Adarsh Mascorro RN and Kannan Chacon RN performed a dual skin assessment on this patient No impairment noted  Geovanny score is 22

## 2018-02-09 NOTE — INTERDISCIPLINARY ROUNDS
IDR/SLIDR Summary          Patient: Jami Crespo MRN: [id number]    Age: 23 y.o. YOB: 1995 Room/Bed: [facility]   Admit Diagnosis: Seizure ([facility]  Headache  Principal Diagnosis: Seiz[facility] ([facility]   Goals: safety  Readmission: NO  Quality Measure: {Quality Measures:58493}  VTE Prophylaxis: Mechanical  Influenza Vaccine screening completed? YES  Pneumococcal Vaccine screening completed? YES  Mobility needs: Yes   Nutrition plan:Yes  Consults:P.T, O.T. and Case Management    Financial concerns:Yes  Escalated to CM? {YES/NO:66823}  RRAT Score: ***   Interventions:{Intervention:77072}  Testing due for pt today?  YES  LOS: 1 days Expected length of stay *** days  Discharge plan: home   PCP: None  Transportation needs: Yes    Days before discharge:{Days before Discharge:59119}  Discharge disposition: Home    Signed:     Aidan Espino RN  2/9/2018  4:52 AM

## 2018-02-09 NOTE — DISCHARGE INSTRUCTIONS
Seizure: Care Instructions  Your Care Instructions    Seizures are caused by abnormal patterns of electrical signals in the brain. They are different for each person. Seizures can affect movement, speech, vision, or awareness. Some people have only slight shaking of a hand and do not pass out. Other people may pass out and have violent shaking of the whole body. Some people appear to stare into space. They are awake, but they can't respond normally. Later, they may not remember what happened. You may need tests to identify the type and cause of the seizures. A seizure may occur only once, or you may have them more than one time. Taking medicines as directed and following up with your doctor may help keep you from having more seizures. The doctor has checked you carefully, but problems can develop later. If you notice any problems or new symptoms, get medical treatment right away. Follow-up care is a key part of your treatment and safety. Be sure to make and go to all appointments, and call your doctor if you are having problems. It's also a good idea to know your test results and keep a list of the medicines you take. How can you care for yourself at home? · Be safe with medicines. Take your medicines exactly as prescribed. Call your doctor if you think you are having a problem with your medicine. · Do not do any activity that could be dangerous to you or others until your doctor says it is safe to do so. For example, do not drive a car, operate machinery, swim, or climb ladders. · Be sure that anyone treating you for any health problem knows that you have had a seizure and what medicines you are taking for it. · Identify and avoid things that may make you more likely to have a seizure. These may include lack of sleep, alcohol or drug use, stress, or not eating. · Make sure you go to your follow-up appointment. When should you call for help? Call 911 anytime you think you may need emergency care. For example, call if:  ? · You have another seizure. ? · You have more than one seizure in 24 hours. ? · You have new symptoms, such as trouble walking, speaking, or thinking clearly. ?Call your doctor now or seek immediate medical care if:  ? · You are not acting normally. ? Watch closely for changes in your health, and be sure to contact your doctor if you have any problems. Where can you learn more? Go to http://magen-padma.info/. Enter U703 in the search box to learn more about \"Seizure: Care Instructions. \"  Current as of: October 14, 2016  Content Version: 11.4  © 7209-1476 [employer]. Care instructions adapted under license by [employer] (which disclaims liability or warranty for this information). If you have questions about a medical condition or this instruction, always ask your healthcare professional. [address] any warranty or liability for your use of this information.

## 2018-02-09 NOTE — DISCHARGE SUMMARY
Discharge Summary       PATIENT ID: Kelly Johnson  MRN: [id number]   YOB: 1995    DATE OF ADMISSION: 2/8/2018  1:33 AM    DATE OF DISCHARGE: 2/9/2018   PRIMARY CARE PROVIDER: None     ATTENDING PHYSICIAN: Honorio Solis  DISCHARGING PROVIDER: Louise Dorantes MD    To contact this individual call [phone] and ask the  to page. If unavailable ask to be transferred the Adult Hospitalist Department. CONSULTATIONS: IP CONSULT TO HOSPITALIST  IP CONSULT TO NEUROLOGY    PROCEDURES/SURGERIES: * No surgery found *    ADMITTING DIAGNOSES & HOSPITAL COURSE:     HISTORY OF PRESENT ILLNESS:  A 47-year-old male with a past medical history of congenital adrenal hyperplasia presented to the emergency department via EMS with reported headache and seizures. The patient currently is obtunded and unable to provide any history. As such, the majority of the history has been obtained from review of ED medical reports. According to the nurse triage report, the patient's friends had noted the patient stood up to drink some water and reportedly did not feel well, subsequently fell to the ground. There were no reports of injury. He had no reported history of having seizures. According to the note, EMS stated the patient was confused upon their arrival at the scene and reportedly had urinary incontinence. He reportedly had been having intermittent headaches over the past 4 years with some other complains of nausea, intermittent photophobia, one episode of vomiting. Reported recurrence of the headaches at 3:30 p.m. yesterday. On arrival in the emergency department, his recorded vital signs were blood pressure 113/72, heart rate 98, respiration rate of 16, saturating 100% on room air. Workup included CT of the head without contrast showing no acute intracranial abnormalities.   While in the emergency department, the patient had seizure activity, which was witnessed by the ER staff, described as tonic-clonic movement with episode of desaturation, which since has resolved. He had been given Ativan 2 mg IV plus an additional 1 mg IV. He was also given Keppra 1000 mg IV, 0.9% normal saline 1000 mL fluid bolus x2. Patient since then was initially postictal and sedated. However, he has been very anxious and agitated with any tactile stimuli. Currently, he is in 4-point restraints secondary to the same to better prevent the patient from self-harm. Patient is now seen for admission to the hospitalist service for continued evaluation and treatment. Hospital Course    Patient was admitted for management of seizure. Patient was seen by neurology, started keppra 500 mg bid. Seizure has been controlled since admission. Initially patient with post ictal state but later recovered. EEG showing moderate generalized encephalopathic process. MRI brain was negative for acute pathology. Patient to follow up with neurology in one week. Advised not to drive until neuro appointment and further instruction given. DISCHARGE DIAGNOSES / PLAN:      1. Seizure  2. Congenital adrenal hyperplasia       PENDING TEST RESULTS:   At the time of discharge the following test results are still pending: None    FOLLOW UP APPOINTMENTS:    Follow-up Information     Follow up With Details Comments Contact Info    [facility] on 2/19/2018 New patient intake appointment- 1:00 p.m.  [address]  [address] [address]      None   None ([phone]) Patient stated that they have no PCP      Gideon Roque,  In 1 week  [address] [facility] at [address] [facility].  [phone]             ADDITIONAL CARE RECOMMENDATIONS: No driving until appointment with neurology.     DIET: Regular Diet  Oral Nutritional Supplements: No Oral Supplement prescribed    ACTIVITY: Activity as tolerated    WOUND CARE: None    EQUIPMENT needed: None      DISCHARGE MEDICATIONS:  Current Discharge Medication List      START taking these medications    Details   levETIRAcetam (KEPPRA) 500 mg tablet Take 1 Tab by mouth two (2) times a day. Qty: 60 Tab, Refills: 0               NOTIFY YOUR PHYSICIAN FOR ANY OF THE FOLLOWING:   Fever over 101 degrees for 24 hours. Chest pain, shortness of breath, fever, chills, nausea, vomiting, diarrhea, change in mentation, falling, weakness, bleeding. Severe pain or pain not relieved by medications. Or, any other signs or symptoms that you may have questions about. DISPOSITION:    Home With:   OT  PT  HH  RN       Long term SNF/Inpatient Rehab    Independent/assisted living    Hospice    Other:       PATIENT CONDITION AT DISCHARGE:     Functional status    Poor     Deconditioned     Independent      Cognition     Lucid     Forgetful     Dementia      Catheters/lines (plus indication)    Weinberg     PICC     PEG     None      Code status     Full code     DNR      PHYSICAL EXAMINATION AT DISCHARGE:     The physical exam is generally normal. He appears well, alert and oriented x 3, pleasant and cooperative. Vitals as noted. ENT normal, neck supple and free of adenopathy, or masses. No thyromegaly or carotid bruits. Cranial nerves and fundi normal. Lungs are clear to auscultation. Heart sounds are normal, no murmurs, clicks, gallops or rubs. Abdomen is soft, no tenderness, masses or organomegaly. Extremities, peripheral pulses and reflexes are normal. Screening neurological exam is normal without focal findings. Skin is normal without suspicious lesions.       CHRONIC MEDICAL DIAGNOSES:  Problem List as of 2/9/2018  Date Reviewed: 2/8/2018          Codes Class Noted - Resolved    * (Principal)Seizure [facility]) ICD-10-CM: R56.9  ICD-9-CM: 052.68  2/8/2018 - Present        Headache ICD-10-CM: R51  ICD-9-CM: 784.0  2/8/2018 - Present              Greater than 30 minutes were spent with the patient on counseling and coordination of care    Signed:   Patricia Wu MD  2/9/2018  1:48 PM

## 2018-02-13 LAB
BACTERIA SPEC CULT: NORMAL
SERVICE CMNT-IMP: NORMAL

## 2018-02-19 ENCOUNTER — OFFICE VISIT (OUTPATIENT)
Dept: NEUROLOGY | Age: 23
End: 2018-02-19

## 2018-02-19 VITALS
OXYGEN SATURATION: 98 % | HEART RATE: 83 BPM | WEIGHT: 113 LBS | SYSTOLIC BLOOD PRESSURE: 108 MMHG | BODY MASS INDEX: 18.24 KG/M2 | RESPIRATION RATE: 18 BRPM | DIASTOLIC BLOOD PRESSURE: 66 MMHG

## 2018-02-19 DIAGNOSIS — G40.309 GENERALIZED EPILEPSY (HCC): Primary | ICD-10-CM

## 2018-02-19 DIAGNOSIS — E27.8 ADRENAL HYPERPLASIA (HCC): ICD-10-CM

## 2018-02-19 RX ORDER — LEVETIRACETAM 250 MG/1
250 TABLET ORAL 2 TIMES DAILY
Qty: 60 TAB | Refills: 2 | Status: SHIPPED | OUTPATIENT
Start: 2018-02-19

## 2018-02-19 NOTE — MR AVS SNAPSHOT
[facility] [address] [place] [address] 
[phone] Patient: Jacqui Alejandro MRN: [id number] :1995 Visit Information Date & Time Provider Department Dept. Phone Encounter #  
 2018  2:30 PM [facility] at [address] [id number] Follow-up Instructions Return in about 3 months (around 2018). Upcoming Health Maintenance Date Due DTaP/Tdap/Td series (1 - Tdap) 2014 Influenza Age 5 to Adult 2017 Allergies as of 2018  Review Complete On: 2018 By: Lisandra Rizo DO No Known Allergies Current Immunizations  Never Reviewed No immunizations on file. Not reviewed this visit You Were Diagnosed With   
  
 Codes Comments Generalized epilepsy ([facility]    -  Primary ICD-10-CM: U86.126 ICD-9-CM: 345.90 Vitals BP Pulse Resp Weight(growth percentile) SpO2 BMI  
 108/66 83 18 113 lb (51.3 kg) 98% 18.24 kg/m2 Smoking Status Never Smoker Vitals History BMI and BSA Data Body Mass Index Body Surface Area  
 18.24 kg/m 2 1.55 m 2 Preferred Pharmacy Pharmacy Name Phone [facility] [address] [phone] Your Updated Medication List  
  
   
This list is accurate as of: 18  2:52 PM.  Always use your most recent med list.  
  
  
  
  
 FLUDROCORTISONE PO Take  by mouth.  
  
 levETIRAcetam 250 mg tablet Commonly known as:  KEPPRA Take 1 Tab by mouth two (2) times a day. PREDNISONE PO Take  by mouth. Prescriptions Sent to Pharmacy Refills  
 levETIRAcetam (KEPPRA) 250 mg tablet 2 Sig: Take 1 Tab by mouth two (2) times a day. Class: Normal  
 Pharmacy: [facility] [address] OBED JAMES [address] [facility], [address] [facility] #: [phone] Route: Oral  
  
Follow-up Instructions Return in about 3 months (around 5/19/2018). Patient Instructions A Healthy Lifestyle: Care Instructions Your Care Instructions A healthy lifestyle can help you feel good, stay at a healthy weight, and have plenty of energy for both work and play. A healthy lifestyle is something you can share with your whole family. A healthy lifestyle also can lower your risk for serious health problems, such as high blood pressure, heart disease, and diabetes. You can follow a few steps listed below to improve your health and the health of your family. Follow-up care is a key part of your treatment and safety. Be sure to make and go to all appointments, and call your doctor if you are having problems. It's also a good idea to know your test results and keep a list of the medicines you take. How can you care for yourself at home? · Do not eat too much sugar, fat, or fast foods. You can still have dessert and treats now and then. The goal is moderation. · Start small to improve your eating habits. Pay attention to portion sizes, drink less juice and soda pop, and eat more fruits and vegetables. ¨ Eat a healthy amount of food. A 3-ounce serving of meat, for example, is about the size of a deck of cards. Fill the rest of your plate with vegetables and whole grains. ¨ Limit the amount of soda and sports drinks you have every day. Drink more water when you are thirsty. ¨ Eat at least 5 servings of fruits and vegetables every day. It may seem like a lot, but it is not hard to reach this goal. A serving or helping is 1 piece of fruit, 1 cup of vegetables, or 2 cups of leafy, raw vegetables. Have an apple or some carrot sticks as an afternoon snack instead of a candy bar. Try to have fruits and/or vegetables at every meal. 
· Make exercise part of your daily routine. You may want to start with simple activities, such as walking, bicycling, or slow swimming.  Try to be active 30 to 60 minutes every day. You do not need to do all 30 to 60 minutes all at once. For example, you can exercise 3 times a day for 10 or 20 minutes. Moderate exercise is safe for most people, but it is always a good idea to talk to your doctor before starting an exercise program. 
· Keep moving. Veto Christopher the lawn, work in the garden, or [employer]. Take the stairs instead of the elevator at work. · If you smoke, quit. People who smoke have an increased risk for heart attack, stroke, cancer, and other lung illnesses. Quitting is hard, but there are ways to boost your chance of quitting tobacco for good. ¨ Use nicotine gum, patches, or lozenges. ¨ Ask your doctor about stop-smoking programs and medicines. ¨ Keep trying. In addition to reducing your risk of diseases in the future, you will notice some benefits soon after you stop using tobacco. If you have shortness of breath or asthma symptoms, they will likely get better within a few weeks after you quit. · Limit how much alcohol you drink. Moderate amounts of alcohol (up to 2 drinks a day for men, 1 drink a day for women) are okay. But drinking too much can lead to liver problems, high blood pressure, and other health problems. Family health If you have a family, there are many things you can do together to improve your health. · Eat meals together as a family as often as possible. · Eat healthy foods. This includes fruits, vegetables, lean meats and dairy, and whole grains. · Include your family in your fitness plan. Most people think of activities such as jogging or tennis as the way to fitness, but there are many ways you and your family can be more active. Anything that makes you breathe hard and gets your heart pumping is exercise. Here are some tips: 
¨ Walk to do errands or to take your child to school or the bus. ¨ Go for a family bike ride after dinner instead of watching TV. Where can you learn more? Go to http://magen-padma.info/. Enter C274 in the search box to learn more about \"A Healthy Lifestyle: Care Instructions. \" Current as of: May 12, 2017 Content Version: 11.4 © 1498-6674 Healthwise, Incorporated. Care instructions adapted under license by [employer] (which disclaims liability or warranty for this information). If you have questions about a medical condition or this instruction, always ask your healthcare professional. [address] any warranty or liability for your use of this information. Introducing [facility]! Paulie Tamayo introduces TicketFire patient portal. Now you can access parts of your medical record, email your doctor's office, and request medication refills online. 1. In your internet browser, go to [email or web address] 2. Click on the First Time User? Click Here link in the Sign In box. You will see the New Member Sign Up page. 3. Enter your TicketFire Access Code exactly as it appears below. You will not need to use this code after youve completed the sign-up process. If you do not sign up before the expiration date, you must request a new code. · TicketFire Access Code: [email or web address] Expires: 5/10/2018  2:00 PM 
 
4. Enter the last four digits of your Social Security Number (xxxx) and Date of Birth (mm/dd/yyyy) as indicated and click Submit. You will be taken to the next sign-up page. 5. Create a TicketFire ID. This will be your TicketFire login ID and cannot be changed, so think of one that is secure and easy to remember. 6. Create a TicketFire password. You can change your password at any time. 7. Enter your Password Reset Question and Answer. This can be used at a later time if you forget your password. 8. Enter your e-mail address. You will receive e-mail notification when new information is available in [address] 9. Click Sign Up.  You can now view and download portions of your medical record. 10. Click the Download Summary menu link to download a portable copy of your medical information. If you have questions, please visit the Frequently Asked Questions section of the [employer] website. Remember, [employer] is NOT to be used for urgent needs. For medical emergencies, dial 911. Now available from your iPhone and Android! Please provide this summary of care documentation to your next provider. Your primary care clinician is listed as NONE. If you have any questions after today's visit, please call [phone].

## 2018-02-19 NOTE — PROGRESS NOTES
Neurology Clinic Follow up Note    Patient ID:  Buddy Landeros  [id number]  23 y.o.  1995      Mr. Rolle is here for follow up today of  Chief Complaint   Patient presents with    Seizure          Last Appointment With Me:  Hospital F/U     He tells me he has been experiencing episodes of jenna vu since 2012 which are sporadic, typically lasting 10-15 seconds at most followed by migraines, nausea. No associated confusion or LOC but +fatigue following events. Frequency varies from once every few months to several times weekly. He denies ever having convulsive seizure activity prior to recent admission. Interval History:   Pt denies recurrence of seizures since recent admission. No further seizure aura reported. He has self reduced LEV to 1/2 tablet daily due to side effects with medication. He states the medication makes him feel out of sorts and \"not himself\". He is also worried that he will develop a tolerance for the medication over time and continue to require higher and higher dosing. PMHx/ PSHx/ FHx/ SHx:  Reviewed and unchanged previous visit. Past Medical History:   Diagnosis Date    Adrenal hyperplasia, congenital ([facility]          ROS:  Comprehensive review of systems negative except for as noted above. Objective:       Meds:  Current Outpatient Prescriptions   Medication Sig Dispense Refill    PREDNISONE PO Take  by mouth.  FLUDROCORTISONE ACETATE (FLUDROCORTISONE PO) Take  by mouth.  levETIRAcetam (KEPPRA) 500 mg tablet Take 1 Tab by mouth two (2) times a day. 60 Tab 0       Exam:  Visit Vitals    /66    Pulse 83    Resp 18    Wt 51.3 kg (113 lb)    SpO2 98%    BMI 18.24 kg/m2     NEUROLOGICAL EXAM:  General: Awake, alert, speech fluent  CN: PERRL, EOMI without nystagmus, VFF to confrontation, facial sensation and strength are normal and symmetric, hearing is intact to finger rub bilaterally, palate and tongue movements are intact and symmetric.   Motor: Normal tone, bulk and strength bilaterally. Reflexes: 2/4 and symmetric, plantar stimulation is flexor. Coordination: FNF, PHILOMENA, HTS intact. Sensation: LT intact throughout. Gait: Normal-based and steady. LABS  Results for orders placed or performed during the hospital encounter of 02/08/18   CULTURE, BLOOD, PAIRED   Result Value Ref Range    Special Requests: NO SPECIAL REQUESTS      Culture result: NO GROWTH 5 DAYS     CBC WITH AUTOMATED DIFF   Result Value Ref Range    WBC 6.9 4.1 - 11.1 K/uL    RBC 5.33 4.10 - 5.70 M/uL    HGB 14.4 12.1 - 17.0 g/dL    HCT 42.4 36.6 - 50.3 %    MCV 79.5 (L) 80.0 - 99.0 FL    MCH 27.0 26.0 - 34.0 PG    MCHC 34.0 30.0 - 36.5 g/dL    RDW 14.1 11.5 - 14.5 %    PLATELET 831 273 - 966 K/uL    MPV 12.6 8.9 - 12.9 FL    NRBC 0.0 0  WBC    ABSOLUTE NRBC 0.00 0.00 - 0.01 K/uL    NEUTROPHILS 59 32 - 75 %    LYMPHOCYTES 23 12 - 49 %    MONOCYTES 7 5 - 13 %    EOSINOPHILS 10 (H) 0 - 7 %    BASOPHILS 1 0 - 1 %    IMMATURE GRANULOCYTES 0 0.0 - 0.5 %    ABS. NEUTROPHILS 4.1 1.8 - 8.0 K/UL    ABS. LYMPHOCYTES 1.6 0.8 - 3.5 K/UL    ABS. MONOCYTES 0.5 0.0 - 1.0 K/UL    ABS. EOSINOPHILS 0.7 (H) 0.0 - 0.4 K/UL    ABS. BASOPHILS 0.0 0.0 - 0.1 K/UL    ABS. IMM. GRANS. 0.0 0.00 - 0.04 K/UL    DF AUTOMATED     METABOLIC PANEL, COMPREHENSIVE   Result Value Ref Range    Sodium 138 136 - 145 mmol/L    Potassium 3.9 3.5 - 5.1 mmol/L    Chloride 102 97 - 108 mmol/L    CO2 26 21 - 32 mmol/L    Anion gap 10 5 - 15 mmol/L    Glucose 73 65 - 100 mg/dL    BUN 9 6 - 20 MG/DL    Creatinine 1.07 0.70 - 1.30 MG/DL    BUN/Creatinine ratio 8 (L) 12 - 20      GFR est AA >60 >60 ml/min/1.73m2    GFR est non-AA >60 >60 ml/min/1.73m2    Calcium 9.5 8.5 - 10.1 MG/DL    Bilirubin, total 1.3 (H) 0.2 - 1.0 MG/DL    ALT (SGPT) 20 12 - 78 U/L    AST (SGOT) 18 15 - 37 U/L    Alk.  phosphatase 91 45 - 117 U/L    Protein, total 8.2 6.4 - 8.2 g/dL    Albumin 4.3 3.5 - 5.0 g/dL    Globulin 3.9 2.0 - 4.0 g/dL    A-G Ratio 1.1 1.1 - 2.2     MAGNESIUM   Result Value Ref Range    Magnesium 2.5 (H) 1.6 - 2.4 mg/dL   PHOSPHORUS   Result Value Ref Range    Phosphorus 2.4 (L) 2.6 - 4.7 MG/DL   ETHYL ALCOHOL   Result Value Ref Range    ALCOHOL(ETHYL),SERUM <10 <10 MG/DL   URINALYSIS W/ REFLEX CULTURE   Result Value Ref Range    Color YELLOW      Appearance CLEAR      Specific gravity 1.025      pH (UA) 6.0      Protein NEGATIVE  mg/dL    Glucose NEGATIVE  mg/dL    Ketone >80 mg/dL    Bilirubin NEGATIVE       Blood NEGATIVE       Urobilinogen 0.2 EU/dL    Nitrites NEGATIVE       Leukocyte Esterase NEGATIVE       WBC 0-4 0 - 4 /hpf    RBC 0-5 0 - 5 /hpf    Epithelial cells FEW FEW /lpf    Bacteria NEGATIVE  NEG /hpf    UA:UC IF INDICATED CULTURE NOT INDICATED BY UA RESULT CNI      Mucus TRACE (A) NEG /lpf   DRUG SCREEN, URINE   Result Value Ref Range    AMPHETAMINES NEGATIVE  NEG      BARBITURATES NEGATIVE  NEG      BENZODIAZEPINES NEGATIVE  NEG      COCAINE NEGATIVE  NEG      METHADONE NEGATIVE  NEG      OPIATES NEGATIVE  NEG      PCP(PHENCYCLIDINE) NEGATIVE  NEG      THC (TH-CANNABINOL) POSITIVE (A) NEG      Drug screen comment (NOTE)    LACTIC ACID   Result Value Ref Range    Lactic acid 0.9 0.4 - 2.0 MMOL/L   CK   Result Value Ref Range     (H) 39 - 308 U/L   TROPONIN I   Result Value Ref Range    Troponin-I, Qt. <0.04 <1.36 ng/mL   METABOLIC PANEL, BASIC   Result Value Ref Range    Sodium 138 136 - 145 mmol/L    Potassium 3.5 3.5 - 5.1 mmol/L    Chloride 107 97 - 108 mmol/L    CO2 18 (L) 21 - 32 mmol/L    Anion gap 13 5 - 15 mmol/L    Glucose 57 (L) 65 - 100 mg/dL    BUN 4 (L) 6 - 20 MG/DL    Creatinine 0.74 0.70 - 1.30 MG/DL    BUN/Creatinine ratio 5 (L) 12 - 20      GFR est AA >60 >60 ml/min/1.73m2    GFR est non-AA >60 >60 ml/min/1.73m2    Calcium 8.6 8.5 - 10.1 MG/DL   MAGNESIUM   Result Value Ref Range    Magnesium 1.7 1.6 - 2.4 mg/dL   CBC WITH AUTOMATED DIFF   Result Value Ref Range    WBC 6.6 4.1 - 11.1 K/uL    RBC 4.66 4.10 - 5.70 M/uL    HGB 12.7 12.1 - 17.0 g/dL    HCT 37.1 36.6 - 50.3 %    MCV 79.6 (L) 80.0 - 99.0 FL    MCH 27.3 26.0 - 34.0 PG    MCHC 34.2 30.0 - 36.5 g/dL    RDW 13.6 11.5 - 14.5 %    PLATELET 056 680 - 934 K/uL    NRBC 0.0 0  WBC    ABSOLUTE NRBC 0.00 0.00 - 0.01 K/uL    NEUTROPHILS 66 32 - 75 %    BAND NEUTROPHILS 1 0 - 6 %    LYMPHOCYTES 21 12 - 49 %    MONOCYTES 8 5 - 13 %    EOSINOPHILS 3 0 - 7 %    BASOPHILS 1 0 - 1 %    IMMATURE GRANULOCYTES 0 %    ABS. NEUTROPHILS 4.4 1.8 - 8.0 K/UL    ABS. LYMPHOCYTES 1.4 0.8 - 3.5 K/UL    ABS. MONOCYTES 0.5 0.0 - 1.0 K/UL    ABS. EOSINOPHILS 0.2 0.0 - 0.4 K/UL    ABS. BASOPHILS 0.1 0.0 - 0.1 K/UL    ABS. IMM. GRANS. 0.0 K/UL    DF MANUAL      RBC COMMENTS ANISOCYTOSIS  1+        RBC COMMENTS OVALOCYTES  PRESENT        RBC COMMENTS MICROCYTOSIS  1+        WBC COMMENTS REACTIVE LYMPHS     GLUCOSE, POC   Result Value Ref Range    Glucose (POC) 77 65 - 100 mg/dL    Performed by [email or web address]    GLUCOSE, POC   Result Value Ref Range    Glucose (POC) 105 (H) 65 - 100 mg/dL    Performed by [email or web address]    EKG, 12 LEAD, INITIAL   Result Value Ref Range    Ventricular Rate 95 BPM    Atrial Rate 95 BPM    P-R Interval 150 ms    QRS Duration 74 ms    Q-T Interval 330 ms    QTC Calculation (Bezet) 414 ms    Calculated P Axis 56 degrees    Calculated R Axis 99 degrees    Calculated T Axis 58 degrees    Diagnosis       Normal sinus rhythm  No previous ECGs available  Confirmed by Nedra León MD, Mojgan Lopez ([id number]) on 2/8/2018 4:05:40 PM         IMAGING:  MRI Results (most recent):    Results from Hospital Encounter encounter on 02/08/18   MRI BRAIN W WO CONT   Narrative INDICATION:  new onset seizure d/o     COMPARISON:  CT head February 8, 2018    TECHNIQUE:  MR imaging of the brain was performed per seizure protocol including  sagittal T1, coronal high resolution T2 , axial T1, T2, FLAIR, DWI/ADC, GRE.      CONTRAST:      Pre and post contrast imaging was performed using 12 mL of gadolinium. FINDINGS:      Ventricles:  Midline, no hydrocephalus. Intracranial Hemorrhage:  None. Brain Parenchyma/Brainstem:  Normal for age. No acute infarction. Basal Cisterns:  Normal.   Temporal Lobes:  No significant abnormality. Flow Voids:  Normal.  Additional Comments:  No abnormal enhancement. Impression IMPRESSION:  No significant abnormality. No acute process. Assessment:     Encounter Diagnoses     ICD-10-CM ICD-9-CM   1. Generalized epilepsy ([address] G40.309 345.90   2. Adrenal hyperplasia (HCC) E27.8 22.8   23year old AAM h/o adrenal hyperplasia here for f/u of new onset generalized seizure activity x 2 since 2/8/18.  No seizure risk factors, although endorses a h/o \"jenna vu\" followed by migraines/nausea and fatigue since 2012. Head CT showing NAP. Utox +THC, no other substances identified.  EEG with moderate diffuse slowing, sharply contoured waves greatest over the right hemisphere.  MRI Brain completed and with out intracranial mass or cortical dysplasia. No recurrence of generalized seizure activity or aura since discharge. Discussed importance of medication compliance to avoid further seizure activity. He is willing to resume LEV BID dosing at a slightly lower dosage. Plan:   LEV 250mg BID for seizure ppx  Advised of seizure precautions including no driving x 6 months from last seizure activity    Follow-up Disposition:  Return in about 3 months (around 5/19/2018).       Signed:  Montez Cabezas DO  2/19/2018

## 2018-02-19 NOTE — PATIENT INSTRUCTIONS
